# Patient Record
Sex: MALE | Race: OTHER | HISPANIC OR LATINO | URBAN - METROPOLITAN AREA
[De-identification: names, ages, dates, MRNs, and addresses within clinical notes are randomized per-mention and may not be internally consistent; named-entity substitution may affect disease eponyms.]

---

## 2020-12-28 ENCOUNTER — INPATIENT (INPATIENT)
Facility: HOSPITAL | Age: 40
LOS: 0 days | Discharge: HOME | DRG: 390 | End: 2020-12-29
Attending: SURGERY | Admitting: SURGERY
Payer: MEDICAID

## 2020-12-28 VITALS
SYSTOLIC BLOOD PRESSURE: 142 MMHG | TEMPERATURE: 98 F | RESPIRATION RATE: 18 BRPM | HEIGHT: 62 IN | OXYGEN SATURATION: 98 % | DIASTOLIC BLOOD PRESSURE: 100 MMHG | WEIGHT: 149.91 LBS | HEART RATE: 88 BPM

## 2020-12-28 DIAGNOSIS — K56.609 UNSPECIFIED INTESTINAL OBSTRUCTION, UNSPECIFIED AS TO PARTIAL VERSUS COMPLETE OBSTRUCTION: ICD-10-CM

## 2020-12-28 LAB
ALBUMIN SERPL ELPH-MCNC: 4.8 G/DL — SIGNIFICANT CHANGE UP (ref 3.3–5)
ALP SERPL-CCNC: 83 U/L — SIGNIFICANT CHANGE UP (ref 40–120)
ALT FLD-CCNC: 18 U/L — SIGNIFICANT CHANGE UP (ref 10–45)
ANION GAP SERPL CALC-SCNC: 14 MMOL/L — SIGNIFICANT CHANGE UP (ref 5–17)
APTT BLD: 29.4 SEC — SIGNIFICANT CHANGE UP (ref 27.5–35.5)
AST SERPL-CCNC: 14 U/L — SIGNIFICANT CHANGE UP (ref 10–40)
BASE EXCESS BLDV CALC-SCNC: 1.1 MMOL/L — SIGNIFICANT CHANGE UP (ref -2–2)
BASOPHILS # BLD AUTO: 0 K/UL — SIGNIFICANT CHANGE UP (ref 0–0.2)
BASOPHILS NFR BLD AUTO: 0 % — SIGNIFICANT CHANGE UP (ref 0–2)
BILIRUB SERPL-MCNC: 1 MG/DL — SIGNIFICANT CHANGE UP (ref 0.2–1.2)
BLD GP AB SCN SERPL QL: NEGATIVE — SIGNIFICANT CHANGE UP
BUN SERPL-MCNC: 13 MG/DL — SIGNIFICANT CHANGE UP (ref 7–23)
CA-I SERPL-SCNC: 1.16 MMOL/L — SIGNIFICANT CHANGE UP (ref 1.12–1.3)
CALCIUM SERPL-MCNC: 9.5 MG/DL — SIGNIFICANT CHANGE UP (ref 8.4–10.5)
CHLORIDE BLDV-SCNC: 106 MMOL/L — SIGNIFICANT CHANGE UP (ref 96–108)
CHLORIDE SERPL-SCNC: 100 MMOL/L — SIGNIFICANT CHANGE UP (ref 96–108)
CO2 BLDV-SCNC: 27 MMOL/L — SIGNIFICANT CHANGE UP (ref 22–30)
CO2 SERPL-SCNC: 23 MMOL/L — SIGNIFICANT CHANGE UP (ref 22–31)
CREAT SERPL-MCNC: 0.71 MG/DL — SIGNIFICANT CHANGE UP (ref 0.5–1.3)
EOSINOPHIL # BLD AUTO: 0.05 K/UL — SIGNIFICANT CHANGE UP (ref 0–0.5)
EOSINOPHIL NFR BLD AUTO: 1.2 % — SIGNIFICANT CHANGE UP (ref 0–6)
GAS PNL BLDV: 136 MMOL/L — SIGNIFICANT CHANGE UP (ref 135–145)
GAS PNL BLDV: SIGNIFICANT CHANGE UP
GAS PNL BLDV: SIGNIFICANT CHANGE UP
GLUCOSE BLDC GLUCOMTR-MCNC: 136 MG/DL — HIGH (ref 70–99)
GLUCOSE BLDV-MCNC: 153 MG/DL — HIGH (ref 70–99)
GLUCOSE SERPL-MCNC: 150 MG/DL — HIGH (ref 70–99)
HCO3 BLDV-SCNC: 26 MMOL/L — SIGNIFICANT CHANGE UP (ref 21–29)
HCT VFR BLD CALC: 43.1 % — SIGNIFICANT CHANGE UP (ref 39–50)
HCT VFR BLDA CALC: 48 % — SIGNIFICANT CHANGE UP (ref 39–50)
HGB BLD CALC-MCNC: 15.8 G/DL — SIGNIFICANT CHANGE UP (ref 13–17)
HGB BLD-MCNC: 15 G/DL — SIGNIFICANT CHANGE UP (ref 13–17)
INR BLD: 1.1 RATIO — SIGNIFICANT CHANGE UP (ref 0.88–1.16)
LACTATE BLDV-MCNC: 1.4 MMOL/L — SIGNIFICANT CHANGE UP (ref 0.7–2)
LIDOCAIN IGE QN: 32 U/L — SIGNIFICANT CHANGE UP (ref 7–60)
LYMPHOCYTES # BLD AUTO: 1.19 K/UL — SIGNIFICANT CHANGE UP (ref 1–3.3)
LYMPHOCYTES # BLD AUTO: 28.5 % — SIGNIFICANT CHANGE UP (ref 13–44)
MCHC RBC-ENTMCNC: 29.5 PG — SIGNIFICANT CHANGE UP (ref 27–34)
MCHC RBC-ENTMCNC: 34.8 GM/DL — SIGNIFICANT CHANGE UP (ref 32–36)
MCV RBC AUTO: 84.8 FL — SIGNIFICANT CHANGE UP (ref 80–100)
MONOCYTES # BLD AUTO: 0.55 K/UL — SIGNIFICANT CHANGE UP (ref 0–0.9)
MONOCYTES NFR BLD AUTO: 13.2 % — SIGNIFICANT CHANGE UP (ref 2–14)
NEUTROPHILS # BLD AUTO: 2.39 K/UL — SIGNIFICANT CHANGE UP (ref 1.8–7.4)
NEUTROPHILS NFR BLD AUTO: 57.1 % — SIGNIFICANT CHANGE UP (ref 43–77)
NRBC # BLD: 0 /100 WBCS — SIGNIFICANT CHANGE UP (ref 0–0)
OTHER CELLS CSF MANUAL: 17 ML/DL — LOW (ref 18–22)
PCO2 BLDV: 43 MMHG — SIGNIFICANT CHANGE UP (ref 35–50)
PH BLDV: 7.39 — SIGNIFICANT CHANGE UP (ref 7.35–7.45)
PLATELET # BLD AUTO: 201 K/UL — SIGNIFICANT CHANGE UP (ref 150–400)
PO2 BLDV: 46 MMHG — HIGH (ref 25–45)
POTASSIUM BLDV-SCNC: 3.4 MMOL/L — LOW (ref 3.5–5.3)
POTASSIUM SERPL-MCNC: 3.5 MMOL/L — SIGNIFICANT CHANGE UP (ref 3.5–5.3)
POTASSIUM SERPL-SCNC: 3.5 MMOL/L — SIGNIFICANT CHANGE UP (ref 3.5–5.3)
PROT SERPL-MCNC: 8 G/DL — SIGNIFICANT CHANGE UP (ref 6–8.3)
PROTHROM AB SERPL-ACNC: 13.1 SEC — SIGNIFICANT CHANGE UP (ref 10.6–13.6)
RBC # BLD: 5.08 M/UL — SIGNIFICANT CHANGE UP (ref 4.2–5.8)
RBC # FLD: 12.4 % — SIGNIFICANT CHANGE UP (ref 10.3–14.5)
RH IG SCN BLD-IMP: POSITIVE — SIGNIFICANT CHANGE UP
SAO2 % BLDV: 80 % — SIGNIFICANT CHANGE UP (ref 67–88)
SARS-COV-2 RNA SPEC QL NAA+PROBE: SIGNIFICANT CHANGE UP
SODIUM SERPL-SCNC: 137 MMOL/L — SIGNIFICANT CHANGE UP (ref 135–145)
WBC # BLD: 4.18 K/UL — SIGNIFICANT CHANGE UP (ref 3.8–10.5)
WBC # FLD AUTO: 4.18 K/UL — SIGNIFICANT CHANGE UP (ref 3.8–10.5)

## 2020-12-28 PROCEDURE — 74177 CT ABD & PELVIS W/CONTRAST: CPT | Mod: 26

## 2020-12-28 PROCEDURE — 76705 ECHO EXAM OF ABDOMEN: CPT | Mod: 26

## 2020-12-28 PROCEDURE — 99285 EMERGENCY DEPT VISIT HI MDM: CPT

## 2020-12-28 PROCEDURE — 99222 1ST HOSP IP/OBS MODERATE 55: CPT

## 2020-12-28 RX ORDER — ENOXAPARIN SODIUM 100 MG/ML
40 INJECTION SUBCUTANEOUS DAILY
Refills: 0 | Status: DISCONTINUED | OUTPATIENT
Start: 2020-12-28 | End: 2020-12-29

## 2020-12-28 RX ORDER — INFLUENZA VIRUS VACCINE 15; 15; 15; 15 UG/.5ML; UG/.5ML; UG/.5ML; UG/.5ML
0.5 SUSPENSION INTRAMUSCULAR ONCE
Refills: 0 | Status: DISCONTINUED | OUTPATIENT
Start: 2020-12-28 | End: 2020-12-29

## 2020-12-28 RX ORDER — SODIUM CHLORIDE 9 MG/ML
1000 INJECTION, SOLUTION INTRAVENOUS
Refills: 0 | Status: DISCONTINUED | OUTPATIENT
Start: 2020-12-28 | End: 2020-12-29

## 2020-12-28 RX ORDER — SODIUM CHLORIDE 9 MG/ML
1000 INJECTION INTRAMUSCULAR; INTRAVENOUS; SUBCUTANEOUS ONCE
Refills: 0 | Status: COMPLETED | OUTPATIENT
Start: 2020-12-28 | End: 2020-12-28

## 2020-12-28 RX ORDER — DIATRIZOATE MEGLUMINE 180 MG/ML
90 INJECTION, SOLUTION INTRAVESICAL ONCE
Refills: 0 | Status: DISCONTINUED | OUTPATIENT
Start: 2020-12-28 | End: 2020-12-28

## 2020-12-28 RX ORDER — KETOROLAC TROMETHAMINE 30 MG/ML
15 SYRINGE (ML) INJECTION ONCE
Refills: 0 | Status: DISCONTINUED | OUTPATIENT
Start: 2020-12-28 | End: 2020-12-28

## 2020-12-28 RX ADMIN — ENOXAPARIN SODIUM 40 MILLIGRAM(S): 100 INJECTION SUBCUTANEOUS at 12:17

## 2020-12-28 RX ADMIN — SODIUM CHLORIDE 110 MILLILITER(S): 9 INJECTION, SOLUTION INTRAVENOUS at 12:18

## 2020-12-28 RX ADMIN — SODIUM CHLORIDE 110 MILLILITER(S): 9 INJECTION, SOLUTION INTRAVENOUS at 09:01

## 2020-12-28 RX ADMIN — SODIUM CHLORIDE 1000 MILLILITER(S): 9 INJECTION INTRAMUSCULAR; INTRAVENOUS; SUBCUTANEOUS at 02:37

## 2020-12-28 RX ADMIN — Medication 15 MILLIGRAM(S): at 02:37

## 2020-12-28 NOTE — H&P ADULT - ASSESSMENT
Mr. Nair is a 40 year old man without PMH who presents with abdominal pain and diarrhea, with CT scan suggestive of primary SBO. Primary SBO are uncommon in young patients but may represent malignancy, so observation is necessary despite the patient not being obstructed at this time. Also on the differential is COVID related GI symptoms and gastroenteritis.     Plan:  - Admit to Dr. Pauly Nazario  - NPO/IVF  - no role for abx given afebrile, WBC normal  - patient without nausea or emesis since 48hrs ago, NGT not placed at this time   - no pain meds unless examined  - lov for DVT ppx  - if patient continues to improve, will consider d/c  - if patient becomes nauseated, vomits, or abdominal pain worsens, will re-scan with PO contrast    Discussed with Dr. Nazario  ACS  [id number]   Mr. Nair is a 40 year old man without PMH who presents with abdominal pain and diarrhea, with CT scan suggestive of primary SBO. Primary SBO are uncommon in young patients but may represent malignancy, so observation is necessary despite the patient not being obstructed at this time. Also on the differential is COVID related GI symptoms and gastroenteritis.     Plan:  - Admit to Dr. Pauly Nazario  - NPO/IVF  - no role for abx given afebrile, WBC normal  - patient without nausea or emesis since 48hrs ago, NGT not placed at this time   - no pain meds unless examined  - f/u c-diff & stool ova and parasites  - lov for DVT ppx  - if patient continues to improve, will consider d/c  - if patient becomes nauseated, vomits, or abdominal pain worsens, will re-scan with PO contrast    Discussed with Dr. Nazario  ACS  [id number]

## 2020-12-28 NOTE — H&P ADULT - NSHPPHYSICALEXAM_GEN_ALL_CORE
Physical Examination:  GEN: NAD, resting quietly  NEURO: AAOx3, CN II-XII grossly intact, no focal deficits  PULM: symmetric chest rise bilaterally, no increased WOB  ABD: soft, nondistended, minimally tender in the epigastric region, no rebound, no guarding  EXTR: no cyanosis or edema, moving all extremities

## 2020-12-28 NOTE — ED PROVIDER NOTE - ATTENDING CONTRIBUTION TO CARE
Attending MD Carol Chun:  I personally have seen and examined this patient.  Resident note reviewed and agree on plan of care and except where noted.  See HPI, PE, and MDM for details.

## 2020-12-28 NOTE — ED PROVIDER NOTE - CLINICAL SUMMARY MEDICAL DECISION MAKING FREE TEXT BOX
41yo male p/w abdominal pain, diarrhea and vomiting x1. Periumbilical TTP. Appendicitis vs colitis vs diverticulitis. Labs, pain control, CT, fluids and reassess. 41yo male p/w abdominal pain, diarrhea and vomiting x1. Periumbilical TTP. Appendicitis vs colitis vs diverticulitis. Labs, pain control, CT, fluids and reassess.  Attending Carol Chun: 41 y/o male presenting with lower abdominal pain and diarrhea. upon arrival pt hemodynamically stable. denies any weight loss, hematemesis or recent travel. pocus performed showing dilated loops of bowel and distended stomach. concern for possible obstruction. no prior abdominal surgeries. no family history of colon ca. will obtain ct scan to further evaluate

## 2020-12-28 NOTE — ED PROVIDER NOTE - PHYSICAL EXAMINATION
Physical Exam:  Gen: NAD, AOx3, non-toxic appearing, able to ambulate without assistance  Head: NCAT  Lung: CTAB, no respiratory distress, no wheezes/rhonchi/rales B/L, speaking in full sentences  CV: RRR, no murmurs, rubs or gallops, distal pulses 2+ b/l  Abd: periumbilical TTP, soft, ND, no guarding, no rigidity, no rebound tenderness, no CVA tenderness   Skin: Warm, well perfused, no rash, no leg swelling  Psych: normal affect, calm Physical Exam:  Gen: NAD, AOx3, non-toxic appearing, able to ambulate without assistance  Head: NCAT  Lung: CTAB, no respiratory distress, no wheezes/rhonchi/rales B/L, speaking in full sentences  CV: RRR, no murmurs, rubs or gallops, distal pulses 2+ b/l  Abd: periumbilical TTP, soft, ND, no guarding, no rigidity, no rebound tenderness, no CVA tenderness   Skin: Warm, well perfused, no rash, no leg swelling  Psych: normal affect, calm  Attending Carol Chun: Gen: NAD, heent: atrauamtic, eomi, perrla, mmm, op pink, uvula midline, neck; nttp, no nuchal rigidity, chest: nttp, no crepitus, cv: rrr, no murmurs, lungs: ctab, abd: soft, ttp LLQ, no peritoneal signs, +BS, no guarding, ext: wwp, neg homans, skin: no rash, neuro: awake and alert, following commands, speech clear, sensation and strength intact, no focal deficits

## 2020-12-28 NOTE — ED ADULT NURSE NOTE - NSIMPLEMENTINTERV_GEN_ALL_ED
Implemented All Universal Safety Interventions:  [place] to call system. Call bell, personal items and telephone within reach. Instruct patient to call for assistance. Room bathroom lighting operational. Non-slip footwear when patient is off stretcher. Physically safe environment: no spills, clutter or unnecessary equipment. Stretcher in lowest position, wheels locked, appropriate side rails in place.

## 2020-12-28 NOTE — H&P ADULT - NSHPLABSRESULTS_GEN_ALL_CORE
----------  LABORATORY DATA:  ----------                        15.0   4.18  )-----------( 201      ( 28 Dec 2020 03:01 )             43.1               12-28    137  |  100  |  13  ----------------------------<  150<H>  3.5   |  23  |  0.71    Ca    9.5      28 Dec 2020 03:01    TPro  8.0  /  Alb  4.8  /  TBili  1.0  /  DBili  x   /  AST  14  /  ALT  18  /  AlkPhos  83  12-28    LIVER FUNCTIONS - ( 28 Dec 2020 03:01 )  Alb: 4.8 g/dL / Pro: 8.0 g/dL / ALK PHOS: 83 U/L / ALT: 18 U/L / AST: 14 U/L / GGT: x                         ----------  RADIOGRAPHIC DATA:  ---------  < from: CT Abdomen and Pelvis w/ IV Cont (12.28.20 @ 03:11) >      FINDINGS:  LOWER CHEST: Within normal limits.    LIVER: Within normal limits.  BILE DUCTS: Normal caliber.  GALLBLADDER: Within normal limits.  SPLEEN: Within normal limits.  PANCREAS: Within normal limits.  ADRENALS: Within normal limits.  KIDNEYS/URETERS: Within normal limits.    BLADDER: Minimally distended.  REPRODUCTIVE ORGANS: Prostate within normal limits.    BOWEL: Markedly distended stomach. Distended loops of small bowel in the mid and lower abdomen with transition point in the left lower quadrant (2:83). No abnormal bowel wall thickening or enhancement, pneumatosis or mesenteric edema. Appendix is normal.  PERITONEUM: No ascites. No pneumoperitoneum.  VESSELS: Within normal limits.  RETROPERITONEUM/LYMPH NODES: No lymphadenopathy.  ABDOMINAL WALL: Within normal limits.  BONES: Within normal limits.l    IMPRESSION:  Small bowel obstruction with a transition point in left lower quadrant. No pneumoperitoneum or evidence of bowel ischemia.    Findings were discussed with Dr. Carol Chun 12/28/2020 3:22 AM by Dr. Chisholm with read back confirmation.    < end of copied text >

## 2020-12-28 NOTE — ED ADULT NURSE REASSESSMENT NOTE - NS ED NURSE REASSESS COMMENT FT1
Report received from cruz RN by Melvi RODRIGUEZ. Pt A&Ox3 and VSS. Present to ED c/o abdominal pain and n/v/d. CT note SBO. Pt admitted to surgery, RTM. COVID negative. No NG tube at this time. Repeat labs to be performed at 1300 today. Report given to Kiesha RODRIGUEZ in [facility].

## 2020-12-28 NOTE — H&P ADULT - HISTORY OF PRESENT ILLNESS
Mr. Nair is a 40 year old man with resolved DM who presents with 2d of abdominal pain with diarrhea. He first noted the pain Friday night significant pain started Saturday morning. The pain was diffuse around the epigastric and geri-umbilical region and crampy in nature. He describes it as 6/10 pain when palpated and 3/10 otherwise but did get pain medication. He reports numerous episodes of watery diarrhea without blood in the stool that has only increased in frequency since Saturday AM. In fact, he had passed gas and had diarrhea 15 min before being examined by surgical team. He has been able to drink pedialyte without nausea but has not been eating too much. He denied fever or chills at home, denies chest pain or cough, as well as weight loss, hematuria, or dysuria. He has no history of diarrhea and no one in the family is currently sick with fever or diarrhea.     In the ED the patient is afebrile, normotensive, and normocardic. Labs demonstrate no leukocytosis, normal BMP, and lactate of 1.4. The patient currently denies any nausea or vomiting and reports minimal abdominal pain. CT scan demonstrates SBO with transition point in the LLQ.

## 2020-12-28 NOTE — ED PROVIDER NOTE - OBJECTIVE STATEMENT
41yo male no PMH p/w 2 days periumbilical pain unrelieved by oral pain meds. Vomited once yesterday. Has had watery NB diarrhea every hour today. Has decreased PO intake. Denies prior abdominal surgeries, fever, recent travel, sick contacts, weight loss. 39yo male no PMH p/w 2 days periumbilical pain unrelieved by oral pain meds. Vomited once yesterday. Has had watery NB diarrhea every hour today. Has decreased PO intake. Denies prior abdominal surgeries, fever, recent travel, sick contacts, weight loss.  Attending Carol Chun[facility] 41 [facility] with [facility] pain and diarrhea. no prior abdominal surgeries. pt denies any new foods. no recent travel. did not receive all vaccinations as child. no blood in the stool. pt reports diffuse lower abdominal pain that is worse in umbilical area. +nausea. no vomiting. no aggrevating or alleviating symptoms

## 2020-12-28 NOTE — PATIENT PROFILE ADULT - FUNCTIONAL SCREEN CURRENT LEVEL: SWALLOWING (IF SCORE 2 OR MORE FOR ANY ITEM, CONSULT REHAB SERVICES), MLM)
CYSTOSCOPY BLADDER BIOPSY WITH BILATERAL RETROGRADE PYELOGRAM
0 = swallows foods/liquids without difficulty

## 2020-12-28 NOTE — ED ADULT NURSE NOTE - OBJECTIVE STATEMENT
Pt is a 41 y/o male c/o periumbilical abdominal pain x2 days, multiple episodes of diarrhea, episode of vomiting yesterday, decreased PO intake. Denies any pmh/psh. Denies CP, SOB, numbness, tingling, cough, fever, chills, dizziness, weakness, headache. A&Ox3. Breathing unlabored and spontaneous. Abdomen soft, nontender, nondistended. Full ROM and strength of extremities. Skin dry and intact. Safety and comfort measures provided, call bell provided to pt and bed in lowest position.

## 2020-12-28 NOTE — H&P ADULT - ATTENDING COMMENTS
Patient seen and examined and agree with above.  40 year old male with 2 days of abdominal pain and diarrhea. The patient underwent a CT scan that demonstrates a SBO with a transition in the LLQ. The patient is passing flatus and having loose bowel movements consistent with an enteritis.   He is hemodynamically stable.  On exam his abdomen is soft, nontender and nondistended.  I have reviewed his laboratory data as well as the imaging. There are no signs of ischemic bowel noted.  I have reviewed his PMH and PSH as well.   Patient is to be admitted to ACS.  NPO IVF for likely enteritis or resolving SBO.  Will advance as tolerated.  Labs repeated in the AM.

## 2020-12-28 NOTE — ED ADULT NURSE REASSESSMENT NOTE - NS ED NURSE REASSESS COMMENT FT1
Pt resting comfortably in bed, A&Ox4 speaking coherently. Has no current c/o pain or discomfort. PIV patent and flushing without difficulty, maintenance IVF running. To be admitted. verbalizes understanding of plan or care, all questions answered.

## 2020-12-29 ENCOUNTER — TRANSCRIPTION ENCOUNTER (OUTPATIENT)
Age: 40
End: 2020-12-29

## 2020-12-29 VITALS
HEART RATE: 70 BPM | TEMPERATURE: 98 F | OXYGEN SATURATION: 99 % | SYSTOLIC BLOOD PRESSURE: 120 MMHG | RESPIRATION RATE: 18 BRPM | DIASTOLIC BLOOD PRESSURE: 79 MMHG

## 2020-12-29 LAB
ANION GAP SERPL CALC-SCNC: 12 MMOL/L — SIGNIFICANT CHANGE UP (ref 5–17)
BUN SERPL-MCNC: 9 MG/DL — SIGNIFICANT CHANGE UP (ref 7–23)
CALCIUM SERPL-MCNC: 8.7 MG/DL — SIGNIFICANT CHANGE UP (ref 8.4–10.5)
CHLORIDE SERPL-SCNC: 106 MMOL/L — SIGNIFICANT CHANGE UP (ref 96–108)
CO2 SERPL-SCNC: 22 MMOL/L — SIGNIFICANT CHANGE UP (ref 22–31)
CREAT SERPL-MCNC: 0.7 MG/DL — SIGNIFICANT CHANGE UP (ref 0.5–1.3)
GLUCOSE SERPL-MCNC: 98 MG/DL — SIGNIFICANT CHANGE UP (ref 70–99)
HCT VFR BLD CALC: 36.9 % — LOW (ref 39–50)
HGB BLD-MCNC: 12.2 G/DL — LOW (ref 13–17)
MAGNESIUM SERPL-MCNC: 2.1 MG/DL — SIGNIFICANT CHANGE UP (ref 1.6–2.6)
MCHC RBC-ENTMCNC: 29.1 PG — SIGNIFICANT CHANGE UP (ref 27–34)
MCHC RBC-ENTMCNC: 33.1 GM/DL — SIGNIFICANT CHANGE UP (ref 32–36)
MCV RBC AUTO: 88.1 FL — SIGNIFICANT CHANGE UP (ref 80–100)
NRBC # BLD: 0 /100 WBCS — SIGNIFICANT CHANGE UP (ref 0–0)
PHOSPHATE SERPL-MCNC: 3.4 MG/DL — SIGNIFICANT CHANGE UP (ref 2.5–4.5)
PLATELET # BLD AUTO: 176 K/UL — SIGNIFICANT CHANGE UP (ref 150–400)
POTASSIUM SERPL-MCNC: 3.8 MMOL/L — SIGNIFICANT CHANGE UP (ref 3.5–5.3)
POTASSIUM SERPL-SCNC: 3.8 MMOL/L — SIGNIFICANT CHANGE UP (ref 3.5–5.3)
RBC # BLD: 4.19 M/UL — LOW (ref 4.2–5.8)
RBC # FLD: 12.4 % — SIGNIFICANT CHANGE UP (ref 10.3–14.5)
SODIUM SERPL-SCNC: 140 MMOL/L — SIGNIFICANT CHANGE UP (ref 135–145)
WBC # BLD: 3.2 K/UL — LOW (ref 3.8–10.5)
WBC # FLD AUTO: 3.2 K/UL — LOW (ref 3.8–10.5)

## 2020-12-29 PROCEDURE — 74177 CT ABD & PELVIS W/CONTRAST: CPT

## 2020-12-29 PROCEDURE — 82435 ASSAY OF BLOOD CHLORIDE: CPT

## 2020-12-29 PROCEDURE — 86850 RBC ANTIBODY SCREEN: CPT

## 2020-12-29 PROCEDURE — 85018 HEMOGLOBIN: CPT

## 2020-12-29 PROCEDURE — 83690 ASSAY OF LIPASE: CPT

## 2020-12-29 PROCEDURE — 82803 BLOOD GASES ANY COMBINATION: CPT

## 2020-12-29 PROCEDURE — 85025 COMPLETE CBC W/AUTO DIFF WBC: CPT

## 2020-12-29 PROCEDURE — 85027 COMPLETE CBC AUTOMATED: CPT

## 2020-12-29 PROCEDURE — 84100 ASSAY OF PHOSPHORUS: CPT

## 2020-12-29 PROCEDURE — 76705 ECHO EXAM OF ABDOMEN: CPT

## 2020-12-29 PROCEDURE — 83605 ASSAY OF LACTIC ACID: CPT

## 2020-12-29 PROCEDURE — 85730 THROMBOPLASTIN TIME PARTIAL: CPT

## 2020-12-29 PROCEDURE — 80053 COMPREHEN METABOLIC PANEL: CPT

## 2020-12-29 PROCEDURE — U0003: CPT

## 2020-12-29 PROCEDURE — 80048 BASIC METABOLIC PNL TOTAL CA: CPT

## 2020-12-29 PROCEDURE — 84132 ASSAY OF SERUM POTASSIUM: CPT

## 2020-12-29 PROCEDURE — 82947 ASSAY GLUCOSE BLOOD QUANT: CPT

## 2020-12-29 PROCEDURE — 85610 PROTHROMBIN TIME: CPT

## 2020-12-29 PROCEDURE — 86901 BLOOD TYPING SEROLOGIC RH(D): CPT

## 2020-12-29 PROCEDURE — 83735 ASSAY OF MAGNESIUM: CPT

## 2020-12-29 PROCEDURE — 85014 HEMATOCRIT: CPT

## 2020-12-29 PROCEDURE — 99232 SBSQ HOSP IP/OBS MODERATE 35: CPT

## 2020-12-29 PROCEDURE — 96374 THER/PROPH/DIAG INJ IV PUSH: CPT | Mod: XU

## 2020-12-29 PROCEDURE — 84295 ASSAY OF SERUM SODIUM: CPT

## 2020-12-29 PROCEDURE — 99285 EMERGENCY DEPT VISIT HI MDM: CPT | Mod: 25

## 2020-12-29 PROCEDURE — 86900 BLOOD TYPING SEROLOGIC ABO: CPT

## 2020-12-29 PROCEDURE — 82962 GLUCOSE BLOOD TEST: CPT

## 2020-12-29 PROCEDURE — 82330 ASSAY OF CALCIUM: CPT

## 2020-12-29 RX ORDER — POTASSIUM CHLORIDE 20 MEQ
20 PACKET (EA) ORAL ONCE
Refills: 0 | Status: DISCONTINUED | OUTPATIENT
Start: 2020-12-29 | End: 2020-12-29

## 2020-12-29 RX ADMIN — ENOXAPARIN SODIUM 40 MILLIGRAM(S): 100 INJECTION SUBCUTANEOUS at 12:45

## 2020-12-29 NOTE — DISCHARGE NOTE PROVIDER - CARE PROVIDER_API CALL
Heide Lomax (MD)  Surgery; Surgical Critical Care  [address], [address] [address]  Phone: [phone]  Fax: [phone]  Follow Up Time: Routine

## 2020-12-29 NOTE — DISCHARGE NOTE PROVIDER - HOSPITAL COURSE
40 year old man with resolved DM who presents with 2d of abdominal pain with diarrhea. He first noted the pain Friday night significant pain started Saturday morning. The pain was diffuse around the epigastric and geri-umbilical region and crampy in nature. He describes it as 6/10 pain when palpated and 3/10 otherwise but did get pain medication. He reports numerous episodes of watery diarrhea without blood in the stool that has only increased in frequency since Saturday AM. In fact, he had passed gas and had diarrhea 15 min before being examined by surgical team. He has been able to drink pedialyte without nausea but has not been eating too much. He denied fever or chills at home, denies chest pain or cough, as well as weight loss, hematuria, or dysuria. He has no history of diarrhea and no one in the family is currently sick with fever or diarrhea.   In the ED the patient is afebrile, normotensive, and normocardic. Labs demonstrate no leukocytosis, normal BMP, and lactate of 1.4. The patient currently denies any nausea or vomiting and reports minimal abdominal pain. CT scan demonstrates SBO with transition point in the LLQ. The patient was admitted to Dr. Nazario, made NPO, started on IV fluids, will f/u c-diff and stool ova/parasites, and started DVT ppx. 12/29 pt is passing flatus but has not had a BM. His pain has improved. His diet was advanced as tolerated.   On day of discharge, the patients vitals were stable, was tolerating diet, voiding adequatley, ambulating well and pain controlled. Pt will f/u with his PCP in 1-2 weeks.   40 year old man with resolved DM who presents with 2d of abdominal pain with diarrhea. He first noted the pain Friday night significant pain started Saturday morning. The pain was diffuse around the epigastric and geri-umbilical region and crampy in nature. He describes it as 6/10 pain when palpated and 3/10 otherwise but did get pain medication. He reports numerous episodes of watery diarrhea without blood in the stool that has only increased in frequency since Saturday AM. In fact, he had passed gas and had diarrhea 15 min before being examined by surgical team. He has been able to drink pedialyte without nausea but has not been eating too much. He denied fever or chills at home, denies chest pain or cough, as well as weight loss, hematuria, or dysuria. He has no history of diarrhea and no one in the family is currently sick with fever or diarrhea.   In the ED the patient is afebrile, normotensive, and normocardic. Labs demonstrate no leukocytosis, normal BMP, and lactate of 1.4. The patient currently denies any nausea or vomiting and reports minimal abdominal pain. CT scan demonstrates SBO with transition point in the LLQ. The patient was admitted to Dr. Nazario, made NPO, started on IV fluids, will f/u c-diff and stool ova/parasites, and started DVT ppx. 12/29 pt is passing flatus but has not had a BM. His pain has improved. His diet was advanced as tolerated. Pt is feeling great and ready for d/c. On day of discharge, the patients vitals were stable, was tolerating diet, voiding adequatley, ambulating well and pain controlled. Pt will f/u with his PCP in 1-2 weeks.

## 2020-12-29 NOTE — DISCHARGE NOTE PROVIDER - NSDCFUADDAPPT_GEN_ALL_CORE_FT
Please make an appointment and follow up with your Primary Care Physician in 1-2 weeks   Please make an appointment and follow up with your Primary Care Physician in 1-2 weeks    Routine followup with Dr. Lomax outpatient

## 2020-12-29 NOTE — PROGRESS NOTE ADULT - ATTENDING COMMENTS
Patient seen and examined and agree with above.  Bowel obstruction appears to be resolved. Likely secondary to enteritis.   He is hemodynamically stable.  On exam his abdomen is soft, nontender and nondistended.  Hemodyanically stable.   Labs reviewed.   Will advance as tolerated.  If tolerates diet he can be discharged to home.

## 2020-12-29 NOTE — DISCHARGE NOTE NURSING/CASE MANAGEMENT/SOCIAL WORK - PATIENT PORTAL LINK FT
You can access the FollowMyHealth Patient Portal offered by [facility] [place] by registering at the following website: [email or web address]. By joining [facility]’s FollowMyHealth portal, you will also be able to view your health information using other applications (apps) compatible with our system.

## 2020-12-29 NOTE — DISCHARGE NOTE NURSING/CASE MANAGEMENT/SOCIAL WORK - NSDCFUADDAPPT_GEN_ALL_CORE_FT
Please make an appointment and follow up with your Primary Care Physician in 1-2 weeks    Routine followup with Dr. Lomax outpatient

## 2020-12-29 NOTE — DISCHARGE NOTE PROVIDER - NSDCCPCAREPLAN_GEN_ALL_CORE_FT
PRINCIPAL DISCHARGE DIAGNOSIS  Diagnosis: Bowel obstruction  Assessment and Plan of Treatment:

## 2020-12-29 NOTE — PROGRESS NOTE ADULT - SUBJECTIVE AND OBJECTIVE BOX
ACS Progress Note    SUBJECTIVE: Pt seen and examined at bedside. He is laying in bed comfortably. He states his pain is better. He has passed flatus but has not had a BM. He/she denies any CP, SOB, fevers, chills, n/v/d      Vital Signs Last 24 Hrs  T(C): 36.6 (29 Dec 2020 05:02), Max: 36.9 (28 Dec 2020 08:00)  T(F): 97.8 (29 Dec 2020 05:02), Max: 98.5 (28 Dec 2020 08:00)  HR: 54 (29 Dec 2020 05:02) (54 - 78)  BP: 116/73 (29 Dec 2020 05:02) (114/73 - 134/91)  BP(mean): --  RR: 17 (29 Dec 2020 05:02) (16 - 17)  SpO2: 98% (29 Dec 2020 05:02) (97% - 100%)  I&O's Summary    28 Dec 2020 07:01  -  29 Dec 2020 07:00  --------------------------------------------------------  IN: 2090 mL / OUT: 0 mL / NET: 2090 mL      I&O's Detail    28 Dec 2020 07:01  -  29 Dec 2020 07:00  --------------------------------------------------------  IN:    Lactated Ringers: 2090 mL  Total IN: 2090 mL    OUT:    Oral Fluid: 0 mL  Total OUT: 0 mL    Total NET: 2090 mL          Labs:                         12.2   3.20  )-----------( 176      ( 29 Dec 2020 07:11 )             36.9     12-28    137  |  100  |  13  ----------------------------<  150<H>  3.5   |  23  |  0.71    Ca    9.5      28 Dec 2020 03:01    TPro  8.0  /  Alb  4.8  /  TBili  1.0  /  DBili  x   /  AST  14  /  ALT  18  /  AlkPhos  83  12-28    PT/INR - ( 28 Dec 2020 04:34 )   PT: 13.1 sec;   INR: 1.10 ratio         PTT - ( 28 Dec 2020 04:34 )  PTT:29.4 sec      Physical Exam:  General: NAD, laying comfortably   Neuro: AAOx3, CN II-XII grossly intact, no focal deficits  Pulm: symmetric chest rise bilaterally, no increased WOB, non labored breathing  ABD: soft, non-tender, no rebound/guarding, no active bleeding/hematoma, no masses palpated  Extremities: warm, dry, no cyanosis or edema, moving all extremities x4

## 2020-12-29 NOTE — PROGRESS NOTE ADULT - ASSESSMENT
40 year old man without PMH who presents with abdominal pain and diarrhea, with CT scan suggestive of primary SBO. Primary SBO are uncommon in young patients but may represent malignancy, so observation is necessary despite the patient not being obstructed at this time. Also on the differential is COVID related GI symptoms and gastroenteritis. He is passing flatus and his pain has improved.    Plan:  - Advance diet as tolerated   - no role for abx given afebrile, WBC normal  - no pain meds unless examined  - f/u c-diff & stool ova and parasites  - lov for DVT ppx  - if patient becomes nauseated, vomits, or abdominal pain worsens, will re-scan with PO contrast    ACS  [id number]

## 2021-01-13 ENCOUNTER — APPOINTMENT (OUTPATIENT)
Dept: TRAUMA SURGERY | Facility: CLINIC | Age: 41
End: 2021-01-13
Payer: MEDICAID

## 2021-01-13 VITALS
DIASTOLIC BLOOD PRESSURE: 86 MMHG | HEART RATE: 66 BPM | HEIGHT: 65 IN | WEIGHT: 155 LBS | SYSTOLIC BLOOD PRESSURE: 127 MMHG | BODY MASS INDEX: 25.83 KG/M2 | TEMPERATURE: 98.1 F

## 2021-01-13 PROBLEM — Z78.9 OTHER SPECIFIED HEALTH STATUS: Chronic | Status: ACTIVE | Noted: 2020-12-28

## 2021-01-13 PROBLEM — Z00.00 ENCOUNTER FOR PREVENTIVE HEALTH EXAMINATION: Status: ACTIVE | Noted: 2021-01-13

## 2021-01-13 PROCEDURE — 99212 OFFICE O/P EST SF 10 MIN: CPT

## 2021-01-13 PROCEDURE — 99072 ADDL SUPL MATRL&STAF TM PHE: CPT

## 2021-03-11 NOTE — PHYSICAL EXAM
[Normal Breath Sounds] : Normal breath sounds [Normal Heart Sounds] : normal heart sounds [Alert] : alert [Oriented to Person] : oriented to person [Oriented to Place] : oriented to place [Oriented to Time] : oriented to time [de-identified] : well appearing male in no acute distress.  [de-identified] : normocephalic, atraumatic  [de-identified] : soft nondistended, nontender, no rebound or guarding. His abdomen is completely benign.  [de-identified] : no rashes noted

## 2021-03-11 NOTE — HISTORY OF PRESENT ILLNESS
[FreeTextEntry1] : 40 y ear old male who had presented to [facility] with abdominal pain and diagnosed with a small bowel obstruction in the LLQ likely secondary to enteritis as the patient was having a lot of diarrhea at that time. The patient denies fever or chills. No nausea or emesis at this time. His bowel function is returning to normal and he is tolerating his diet. He denies abdominal pain. \par I discussed that if this occurs again it would be prudent to perform a diagnostic laparoscopy.

## 2024-12-19 NOTE — ED ADULT NURSE REASSESSMENT NOTE - NS ED NURSE REASSESS COMMENT FT1
Pre-surgical labs obtained and COVID swab. Denies need for pain medication at this time. VSS. Admitted to surgery and awaiting bed placement. Will continue to reassess. Gen: NAD, AOx3  Head: NCAT  HEENT: dry mucosa moist, normal conjunctiva, neck supple  Lung: CTAB, no respiratory distress  CV: rrr, Normal perfusion  Abd: soft, NTND  MSK: No edema, no visible deformities  Neuro: No focal neurologic deficits  Skin: No rash   Psych: normal affect